# Patient Record
(demographics unavailable — no encounter records)

---

## 2017-06-18 NOTE — PDHPUP
History & Physical Update


H&P update statement: 


This history and physical update is based on an assessment of the patient which 

was completed after admission or registration (within 24 hours), but prior to 

the surgery/procedure.








1. Bilateral~Hip Dyplasia, Left side symptomatic with~resultant labral tear


2. Left~Femoroacetabular impingement (BARBARA) Cam type, 


3. Left excessive femoral ante-torsion (48 degrees)





HISTORY OF PRESENT ILLNESS:


Afshanis a 27 y.o.~~~active female~who I have had the pleasure to consult on 

today.~I have enjoyed meeting her. She~lives in [place], [place]. ~Afshanworks 

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 ~She~is ; she~has 3~children. ~Afshan

enjoys running, walking, hiking and working out but not active now.





Lauren's left~hip pain started in~June~2013, with no~recalled trauma or injury, 

and with no~previous complaints.~Afshandoes not have~a known history of hip 

dysplasia. 





Presentation today is of~anterior, groin left~hip pain. ~The hip does~wake her~

at night and does~click and catch on her. Sitting can be uncomfortable~for her. 

Afshandoes~report suffering from lower back pain episodes and she has 

scoliosis.





Afshanhas~participated in physical therapy for 1.5 years~and has not~tried 

other conservative measures including hip injections . She~has not~received 

sufficient symptomatic improvement.





Afshanhas~utilized medication for pain management, including NSAID and OTC 

acetaminophen. Afshanhas used medication for 3 years.





Afsahnhas occasional clicking on the right.





Afshanunderstands that she~has a hip and pelvis problem which should be 

researched and wishes to get a better understanding of her~hip status, followed 

by an establishment of a treatment strategy, hoping she~would be able to get 

back to her~well being active life.














History:








Past medical history: ~


None which is relevant 





Relevant familial history: None which is relevant 





Past surgical history: 


Bladder Surgery in 2000





Afshandenies problematic issues with general anesthesia in the past.





I have reviewed, verified and agree with the past medical, surgical, family and 

social history.





Current Medications:~has a current medication list which includes the following 

prescription(s): ibuprofen and norgestimate-ethinyl estradiol.





ALLERGIES:~has No Known Allergies.














Objective:








Physical Examination:


Afshanis 5~feet 6~inches tall and weighs~155~Lbs. Afshanis AAO x3; she~is well

-nourished, in NAD. Skin is warm and dry. ~Breathing is non-labored. ~CV with 

RRR by pulse. Abdomen is soft, NTND. 


Currently, she~walks with a normal~gait.


Trendelenburg sign is negative~and proprioception~is reduced, both~sides.


She~presents~with moderate~signs of joint laxity.~Beightons Score: 5


She~is fit looking. ~~


Lower spine examination is negative~for sciatic or femoral nerve irritation 

with negative~SLR &~femoral stretch tests. Range of motion of the spine is 

normal~for flexion, extension, and rotations, with no~associated pain. 


Strength, Sensation and pulses are normal - bilaterally


Ankles and knees exams are normal~and no~mal-alignment is evident. 


She~has~no leg length discrepancy.


Thigh circumference is symmetric~with no evidence for muscle atrophy~on both~

sides.





Hip ROM (degrees):











 FL ER


 At 90~hip FL IR


 At 90~hip FL AB AD EX IR


 Neutral hip ER


 Neutral hip


 


R 115 75 35 40 15 10 55 50


 


L 110P 45 50 45 5 10 70 30 (Pain inhibition)








Specific hip and pelvis tests:











 Quadrant CARLTON Roll Add. Longus


 


R + + Negative Negative


 


L +++ +++ Negative +++














 Glut. Med ITB Pos. Imp


 


R Negative


 5/5 strength Negative


 5/5 strength Negative


 


L Negative


 5/5 strength Negative


 5/5 strength Negative








Squeeze test measured normal


Bony Symphysis pubis is pain free~to touch while concentric activity of the 

rectus abdominis, does not~produce pain at its insertion.


Ilio Psos specific tests are negative for pain during cycling for both hips~and 

remarkable for no snap.


HF has good strength, non-painful both hips.


Anterior~capsule tenderness on the left.


Greater trochanteric burse is pain free~on both hips.


Piriformis tests: FAIR is negative, with no~local signs of neuritis related to 

sciatic nerve.


SIJs examination is produces pain on left side~with normal~CARLTON in relation 

and local tenderness.


Hamstrings tests are negative~functional contraction and positive~tendinopathy 

the left hip~at insertion.





On a daily basis, the following percentages reflect Gregs overall total pain:


Deep hip: 90%


Left SIJ: 10%





Imaging:


Radiology studies which I~have personally reviewed, analyzed and measured are 

below:





XR: 


AP of the hip and pelvis: 


Performed in a good~technique 


Coccyx to pubic symphysis distance 3.3~cm.


Upright. Zero degrees.





Shenton~Lines are interrupted on the left.~~~~~~~~~~~~~~~~~~~~~~~


No~Pathological signs are seen in the Symphysis Pubis. 


No~Pathological signs are seen at the Ischial~tuberosity. ~





Specific measurements show: 














 NSA~ LCE Sourcil~Angle  Sharp's angle Lat.


Cam Lat. Pincer C.Over~sign Head~Coverage % ATDmm


 


R 141 10 16 47 + - - 51 N


 


L 134 -8 27 56 + - - 46 N




















 Pos. wall sign ISS NAD ~~Dysplasia Comments


 


R Negative Negative 26.2~mm +++ 


 


L Negative Negative 25.7~mm +++ 


 


 Sclerosis Sup. Lat. OA Cysts Joint Space-WBZ Joint Space-Medial


 


R + Negative Negative 7.5~mm 5.4~mm


 


L + Negative Negative 7.5~mm 7.9~mm











X Table lateral: 


Anterior cam lesion is seen~on both hips.





Alpha Angle: ~


Right 71~degrees


Left 67~degrees





CT MEASUREMENTS:


Right hip:


Lateral center edge angle: 11 degrees


Equatorial acetabular version angle: 29 degrees anteverted.


Cranial acetabular version angle: 19 degrees anteverted.


Femoral neck shaft angle: 140 degrees.


Right femoral torsion measures 19 degrees.





Left hip:


Lateral center edge angle: 5 degrees


Equatorial acetabular version angle: 27 degrees anteverted.


Cranial acetabular version angle: 13 degrees anteverted.


Femoral neck shaft angle: 141 degrees


Left femoral torsion measures 48 degrees.








Impression and plan:Ivett Cavanaughis a 27 y.o.~active female~suffering from symptomatic left~hip pain due 

to Left Hip Dyplasia with symptomatic~labral tear and~Femoroacetabular 

impingement (BARBARA) Cam type,~causing significant disability to her~and altering~

her~sport and life activities.





She has similar radiographic features on the right side.





Physical examination, imaging, and her~story correspond with the diagnosis 

mentioned above.





I explained that hip dysplasia is a condition wherein the hip joint has 

excessive play~and instability due to a variety of factors, including the 

depth and adequacy of the socket, the orientation of the femur bone, and 

ligament laxity around the hip joint. Dysplasia ranges in severity from 

borderline to nolvia, with treatment options being specific to the specific 

nature of the problem. Left untreated, the instability in the hip joint can 

cause progressive tearing of the labrum and deterioration of the surface 

cartilage, ultimately resulting in progressive osteoarthritis of the hip. 





I explained that femoroacetabular impingement (BARBARA - Cam type) arises due to a 

bony or soft tissue conflict between the femur (ball) and acetabulum (socket) 

caused by an abnormality in the shape of the femoral head and neck. Over time, 

repetitive impingement can result in damage to the labrum and adjacent surface 

cartilage within the socket, ultimately giving rise to progressive 

osteoarthritis of the hip.





I explained that although a labral tear can be a source of pain, it is rarely 

the root of the problem and typically occurs secondary to an underlying 

abnormality in the shape and mechanics of the hip joint. 





I reviewed conservative treatment options for Dysplasia and BARBARA including 

activity modification to avoid positions of impingement or instability, 

physical therapy, non-steroidal anti-inflammatory medications, and various 

injections (corticosteroid and PRP) aimed at reducing inflammation in the hip 

joint or/and preventing dynamic instability and impingement. PRP injections may 

promote healing and reduce symptoms in certain cases but it will not repair 

chronically damaged tissue. Although these measures may help to buy time~and 

reduce current level of symptoms, they are not a definitive solution to the 

problem given the underlying abnormality in the shape of the hip joint.





Patients who have failed conservative management and continue to experience 

symptoms are candidates for definitive surgical treatment, which may consist of 

hip arthroscopy alone or in combination with more invasive bony realignment 

procedures of the hip socket and/or femur called periacetabular osteotomy (JANINE) 

or derotational femoral osteotomy (DFO). 





Hip arthroscopy typically includes treating the labrum with either repair or 

reconstruction of the torn labrum; as well as addressing the underlying 

abnormalities by restoring the normal shape to the hip joint. If the cartilage 

is damaged a Microfracture surgical procedure may also be necessary to help 

stimulate the growth of fibrocartilage. If a patient requires a labral 

reconstruction or a Microfracture, the initial rehabilitation from the surgery 

may take longer, but the long term results are typically favorable.





I reviewed the technical aspects of hip arthroscopy including risks, benefits, 

and expected course of recovery. Lauren~understands that hip arthroscopy is a 

minimally invasive outpatient procedure carried out through small incisions on 

the outer aspect of the hip joint. During surgery, the labral tear will be 

identified and either repaired or reconstructed~using bone anchors and suture 

material. Additionally, any excessive bone will be removed with a high-speed 

micky to reshape the hip joint and restore normal anatomy. Risks include 

infection, bleeding, injury to nearby nerves or vessels, stiffness, persistent 

pain, instability, venous thromboembolic disease, and traction related 

complications including temporary foot numbness. Rarely, revision surgery may 

be required to address these problems. Overall recovery takes approximately 4~~

8~months depending on the extent of damage and degree of repair.


In the event that the labral tissue quality is inadequate for successful repair 

and healing, Lauren~understands that a labral reconstruction will be performed. 

This procedure entails placing a cadaver tissue graft within the hip joint and 

stabilizing it with bone anchors to build a new labrum. The overall recovery 

time for labral reconstruction is similar to that of labral repair, although 

the surgical procedure takes longer to perform.





I reviewed the technical aspects of periacetabular osteotomy (JANINE) including 

risks, benefits, and expected course of recovery. Lauren~understands that JANINE 

is an inpatient procedure carried out through two medium sized incisions on the 

front and back of the hip joint. The hip socket is cut, realigned, and 

stabilized with 2 ~3 internal screws. Risks include infection, bleeding, 

injury to nearby nerves or vessels, stiffness, persistent pain, instability, 

failure of bony healing, implant related complications, and venous 

thromboembolic disease. Rarely, revision surgery may be required to address 

these problems. Risks, potential complications, side effects and recovery from 

surgical procedure were discussed in length. We explained how this surgery is 

an open procedure, and though patients tend to do well in the long-term, it 

involves significant pain in the first 2-4 weeks post-op and a rather lengthy 

rehab.~Overall recovery takes approximately 6 ~12~months depending on the 

extent of damage and degree of repair. Afshanunderstands that she~will undergo 

hip arthroscopy 1 week prior to the JANINE to address damage inside the hip joint.





Afshanunderstands that hip arthroscopy and JANINE are two separate procedures 

that are best performed one week apart, with the arthroscopy commencing first 

to "tighten up" any pathology evident in the hip joint (labral repair, etc.) 

and the JANINE open procedure occurring 7-10 days later to realign the acetabulum.





Afshanwill review the info presented.





In order to obtain more detailed information regarding the alignment, 

orientation, and shape of the bony hip and pelvis I will order a CT scan to be 

performed. The results of the CT scan, including femoral torsion and acetabular 

version measured values and 3D images, will aid me in deciding on the best 

treatment strategy and surgical pre-planning.





In order to evaluate the integrity of the surface cartilage within the hip joint

, I will order a delayed gadolinium enhanced MRI of cartilage (dGEMRIC). This 

study will determine whetherAdrianis a good candidate for hip preservation 

surgery.





Afshanwill talk with our surgical scheduler about possible surgery dates.





Afshanis happy with this plan.





I have also supplied her~with handouts, outlining the expected surgical 

treatment and rehab involved.





I wish~Afshanall the best,











~~


Linus Bradley MD

## 2017-06-19 NOTE — PDANEPAE
ANE History of Present Illness


Patient presents for surgery





ANE Past Medical History





- Cardiovascular History


Hx Hypertension: No


Hx Arrhythmias: No


Hx Chest Pain: No


Hx Coronary Artery / Peripheral Vascular Disease: No


Hx CHF / Valvular Disease: No


Hx Palpitations: No





- Pulmonary History


Hx COPD: No


Hx Asthma/Reactive Airway Disease: No


Hx Recent Upper Respiratory Infection: No


Hx Oxygen in Use at Home: No





- Neurologic History


Hx Cerebrovascular Accident: No


Hx Seizures: No


Hx Dementia: No





- Endocrine History


Hx Diabetes: No





- Renal History


Hx Renal Disorders: Yes





- Liver History


Hx Hepatic Disorders: No





- Neurological & Psychiatric Hx


Hx Neurological and Psychiatric Disorders: No





- Cancer History


Hx Cancer: No





- Congenital Disorder History


Hx Congenital Disorders: Yes





- GI History


Hx Gastrointestinal Disorders: No





- Chronic Pain History


Chronic Pain: Yes (bilateral hips)





ANE Review of Systems


Review of systems is: negative





- Exercise capacity


Exercise capacity: >=4 METS


METS (RN): 5 METS





ANE Patient History





- Allergies


Allergies/Adverse Reactions: 








No Known Allergies Allergy (Verified 05/11/17 11:09)


 








- Home Medications


Home Medications: 








Acetaminophen [Tylenol  mg (*)] 500 mg PO DAILY PRN 04/28/17 [Last Taken 

Unknown]


Ferrous Sulfate [Slow Fe 140 MG (*)] 140 mg PO DAILY 04/28/17 [Last Taken 

Unknown]


Ibuprofen [Motrin (*)] 400 mg PO DAILY PRN 04/28/17 [Last Taken Unknown]


Loratadine [Claritin 10 mg] 10 mg PO DAILY PRN 04/28/17 [Last Taken Unknown]


Multivitamins [Multivitamin (*)] 1 each PO DAILY 04/28/17 [Last Taken Unknown]


Naproxen Sodium [Aleve 220 MG (*)] 220 mg PO DAILY PRN 04/28/17 [Last Taken 

Unknown]


Norgestimate-Ethinyl Estradiol [Sprintec] 1 each PO DAILY 04/28/17 [Last Taken 

Unknown]








- NPO status


NPO Since - Liquids (Date): 06/18/17


NPO Since - Solids (Date): 06/18/17


NPO Since - Solids (Time): 20:00





- Anes Hx


Anes Hx: post operative nausea





- Smoking Hx


Smoking Status: Never smoked





- Family Anes Hx


Family Hx Anesthesia Complications: none





ANE Labs/Vital Signs





- Vital Signs


Blood Pressure: 125/88


Heart Rate: 111


Respiratory Rate: 16


O2 Sat (%): 99


Height: 167.64 cm


Weight: 68.039 kg





ANE Physical Exam





- Airway


Mallampati Score: Class 1





- Pulmonary


Pulmonary: no respiratory distress





- Cardiovascular


Cardiovascular: regular rate and rhythym





- ASA Status


ASA Status: II (Prominent upper incisors)





ANE Anesthesia Plan


Anesthesia Plan: general endotracheal anesthesia (RBA discussed, patient 

declines pre-op epidural but consents to PO epidural and or ss FNB in PACU if 

needed. )

## 2017-06-19 NOTE — POSTANESTH
Post Anesthetic Evaluation


Cardiovascular Status: Normal, Stable


Respiratory Status: Requires Airway Assist


Level of Consciousness/Mental Status: Moderately Sleepy


Pain Control: Adequate, Prn Tx Ordered


Nausea/Vomiting Control: Adequate, Prn Tx Ordered


Complications Possibly Related to Anesthesia: None Noted

## 2017-06-19 NOTE — SUROPNOTE
[facility] Operative Report





- Surgery





Surgery was performed at [facility] on 6/19/17 ~~~~~~





~~


OPERATION NOTE~on Lauren Leonardo





Diagnosis: 


1.   Left~Femoroacetabular impingement (BARBARA) Cam type, Mixed type, Cartilage 

damage


2.   Left~Hip Dyplasia





Indication: Failure to obtain satisfactory results with long standing 

conservative measures.





Operation~1:~Left~Arthroscopic Labral repair, Debridement of loose cartilage 

flap + micro fracture of acetabulum, CAM resection, Synovectomy, Capsular repair





Surgeon: ~Milind Coates MD





First Assistant:~Lobo MERA





Anaesthetic:~General





Findings~~Left~Hip:


Labrum: Torn and Frayed around 12-4~O'clock.~attached to inside-out flap, very 

hypertrophic


Acetabulum: Cartilage damage grade 4-dysplasia flap,~extending around 

chondrolabral junction, circumferentially, from 12 (bubble)~to 4, 20-50% rim to 

acatabular fossa


Fovea: Partial tear of LT


Femoral Head: Normal cartilage


Synovium: severe~synovitis


Peripheral Compartment: Anterolateral CAM between 12~Oclock superiorly and 5~O

clock anteriorly - minimal





Procedure: Supine on operating table. General anaesthetic. Antibiotics given.


Standard traction set up, without perineal post.





A spinal needle was guided to the femoral head neck junction and traction 

gradually applied with the joint vented. Local anesthetic infiltrated into the 

skin around the portals. Once 15mm of distraction was achieved the hip needle 

was then passed into the joint staying as close to the femoral head as possible.





A Nytenol wire was passed through the hip needle ensuring that it passed all 

the way to the fovea to


confirm central placement of the needle. Skin was incised and then the portals 

sequentially dilated to 7 mm. Switching stick inserted and 30 scope passed 

over the top. Under dry scope conditions the anterior portal was created by 

passing the hip needle into the joint under direct vision. Again this was 

dilated up to 7 mm and the slotted canule was inserted. The saline was then 

turned on and the joint irrigated. The joint was carefully inspected and 

photographed with findings as above. The arthroscope was switched to the 70 

scope to complete the inspection.





A longitudinal intra portal capsulotomy was then performed using Galena blade 

and the 50 Arthrocare wand to connect the two portals.





Central Compartment Intervention:


Synovectomy was performed. Large IO flap was debrided. Bone was rasped.


The labrum was then repaired~with 3~peek anchors IO, achieving good anatomical 

rim fixation.


Microfractures were applied to the area where the bone was exposed from missing 

cartilage. ~


LT was treated with RF wand to shrink and stabilize reactive tissue.~





Peripheral Compartment Intervention: 


A box-shaped capsulotomy was made with the assistance of SpeedStitch traction 

suture. This allowed traction on the capsule and a good view of the femoral 

neck with smaller capsulotomy.


The articular margin where sphericity was lost was marked with the Arthrocare 

wand under X-ray control. Bone lateral to this was removed with the the micky. 

Portals were switched to deal with the superior headneck junction. Care was 

taken not to stray posterior and laterally in view of the location of the 

retinacular vessels. The cam lesion was addressed from 12~to 5~Oclock.





A dynamic impingement test was undertaken and vision with 90 of flexion and 15

 of internal rotation to confirm that there was no bony or soft tissue 

impingement or deformation of the labrum. ~Good clearance was obtained with 

good labral seal.





The joint was thoroughly irrigated of any loose debris and the anterior capsule 

was repaired with 3~No.1 vicryl stitches, closing 80% of the capsulotomy. The 

skin was then closed with Nylon. Padded dressing was applied.





~~


OPERATION NOTE~on Lauren Leonardo





Diagnosis:Left~Hip Dyplasia~~- Anteverted femoral neck / antetorsion of femur





Indication:~Failure to obtain satisfactory results with long standing 

conservative measures. 





Surgeon: Milind Coates MD





First Assistant: Donnie Martinez MD





Anaesthetic: General





Operation~2: Open left~derotational femoral osteotomy





Procedure: Lauren~was positioned supine on a distraction table, with the legs 

in a scissors position (operated leg leveled, contra-lateral leg hyperextended) 

so fluoroscopy could obtain both AP and lateral views. A 4~cm incision was made 

proximal to the greater trochanter (GT). Using a drill guide and a drill, an 

entry point was established at the tip of the GT followed by introduction of 

ball-tip guide wire~introduced through the femoral canal. ~~After measurements 

of required nail length and width, rimming was performed to 10~mm (in 0.5 mm 

increments) distal to the femoral isthmus. Rimming to~14~mm was performed~to 

the level of the area of the planned osteotomy. An intramedullary saw (12~mm) 

was introduced into the femoral canal, 6~cm below the lesser trochanter. In 

small increments, the medullary saw performed a circumferential inside out cut 

through the femur. This cut was completed in an outside-in manner using a 4~mm 

osteotome. Just before the osteotomy was complete and displacement was 

confirmed with fluoroscopy, 2 Albert pins were drilled~into the femur bone (

one to the lateral GT, one to the supra-condylar region of the distal femur). 

Using fluoroscopic guidance the angle between the Albert pins~as measured 

with a goniometer~and boot scale, the varus and derotational osteotomy was 

performed by rotating the foot outward. The correction planned and obtained was 

approximately 30~degrees external rotation (internal rotation of the femur to a 

relative retroverted torsion). A 9~mm~diameter/ 360~mm length nail was 

introduced into the femoral canal and was~locked proximally and distally with 

screws. Post-operative X-rays were obtained to confirm position and location of 

nail. Albert pins were removed,~ITB was approximated, and sub-cutis and skin 

layers were closed with absorbable suture/nylon/stables.





After surgery,~Lauren~moved both lower limbs and had no NV compromise. ROM in 

neutral hip corresponded well with the torsion change.





Evaluation under Anesthesia:





Pre:











 IR 90 ER 90 ABD Flexion


 


Right 30 70 45 110


 


Left 50 50 45 110











Post op instructions:


1. Non~weight bearing crutches for 6~weeks


2. Pain killers as prescribed


3. Follow up visit with me, as scheduled, where a rehab protocol would be 

discussed


4. Avoid hip external rotation for 4 weeks


5. ~~25 days of NSAIDS need to be taken in order to prevent the possible 

formation of HO - stop 2 days before JANINE


6. Continuous SCDs~


7.~CPM 50~degrees until JANINE








Kind regards, 


~~


Dr. Milind Coates

## 2017-06-21 NOTE — SOAPPROG
SOAP Progress Note


Assessment/Plan: 


Assessment:





2 days post op


Left hip arthroscopy


Left Derotational Osteotomy




















Plan:





DC home


LEFT JANINE on 6/26/17 06/21/17 10:10





Subjective: 





Lauren is doing quite well this morning.  Even without an epidural, her pain is 

well managed with oral analgesics.  She denies any cp, no sob, or nausea.  She 

is ready to go home.


Objective: 





 Vital Signs











Temp Pulse Resp BP Pulse Ox


 


 36.9 C   95   14   97/60 L  94 


 


 06/21/17 07:32  06/21/17 07:32  06/21/17 07:32  06/21/17 04:00  06/21/17 07:32








 Laboratory Results





 06/20/17 04:28 





 06/20/17 04:28 





 











 06/20/17 06/21/17 06/22/17





 05:59 05:59 05:59


 


Intake Total 4700 1000 


 


Output Total 3200 1850 


 


Balance 1500 -850 








Well appearing in NAD





Left hip:





dressings clean, dry, intact


some scattered ecchymosis and edema


NVI distally


edema and ecchymosis down to knee


full ROM of foot and ankle








- Pending Discharge


Pending Discharge Within 24 Hours: Yes


Pending Discharge Date: 06/22/17


Pending Discharge Time: 11:00





ICD10 Worksheet


Patient Problems: 


 Problems











Problem Status Onset


 


Post-op pain Acute  














- ICD10 Problem Qualifiers


(1) Post-op pain

## 2017-06-22 NOTE — PDGENHP
History and Physical





- Chief Complaint


Left Hip Pain





- History of Present Illness


1. Bilateral~Hip Dyplasia, Left side symptomatic with~resultant labral tear


2. Left~Femoroacetabular impingement (BARBARA) Cam type, 


3. Left excessive femoral ante-torsion (48 degrees)





HISTORY OF PRESENT ILLNESS:


Afshanis a 27 y.o.~~~active female~who I have had the pleasure to consult on 

today.~I have enjoyed meeting her. She~lives in [place], [place]. ~Afshanworks 

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 ~She~is ; she~has 3~children. ~Afshan

enjoys running, walking, hiking and working out but not active now.





Lauren's left~hip pain started in~June~2013, with no~recalled trauma or injury, 

and with no~previous complaints.~Afshandoes not have~a known history of hip 

dysplasia. 





Presentation today is of~anterior, groin left~hip pain. ~The hip does~wake her~

at night and does~click and catch on her. Sitting can be uncomfortable~for her. 

Afshandoes~report suffering from lower back pain episodes and she has 

scoliosis.





Afshanhas~participated in physical therapy for 1.5 years~and has not~tried 

other conservative measures including hip injections . She~has not~received 

sufficient symptomatic improvement.





Afshanhas~utilized medication for pain management, including NSAID and OTC 

acetaminophen. Afshanhas used medication for 3 years.





Afshanhas occasional clicking on the right.





Afshanunderstands that she~has a hip and pelvis problem which should be 

researched and wishes to get a better understanding of her~hip status, followed 

by an establishment of a treatment strategy, hoping sheIvettwould be able to get 

back to her~well being active life.














History:








Past medical history: ~


None which is relevant 





Relevant familial history: None which is relevant 





Past surgical history: 


Bladder Surgery in 2000





Afshandenies problematic issues with general anesthesia in the past.





I have reviewed, verified and agree with the past medical, surgical, family and 

social history.





Current Medications:~has a current medication list which includes the following 

prescription(s): ibuprofen and norgestimate-ethinyl estradiol.





ALLERGIES:~has No Known Allergies.














Objective:








Physical Examination:


Afshanis 5~feet 6~inches tall and weighs~155~Lbs. Afshanis AAO x3; she~is well

-nourished, in NAD. Skin is warm and dry. ~Breathing is non-labored. ~CV with 

RRR by pulse. Abdomen is soft, NTND. 


Currently, she~walks with a normal~gait.


Trendelenburg sign is negative~and proprioception~is reduced, both~sides.


She~presents~with moderate~signs of joint laxity.~Beightons Score: 5


She~is fit looking. ~~


Lower spine examination is negative~for sciatic or femoral nerve irritation 

with negative~SLR &~femoral stretch tests. Range of motion of the spine is 

normal~for flexion, extension, and rotations, with no~associated pain. 


Strength, Sensation and pulses are normal - bilaterally


Ankles and knees exams are normal~and no~mal-alignment is evident. 


She~has~no leg length discrepancy.


Thigh circumference is symmetric~with no evidence for muscle atrophy~on both~

sides.





Hip ROM (degrees):











 FL ER


 At 90~hip FL IR


 At 90~hip FL AB AD EX IR


 Neutral hip ER


 Neutral hip


 


R 115 75 35 40 15 10 55 50


 


L 110P 45 50 45 5 10 70 30 (Pain inhibition)








Specific hip and pelvis tests:











 Quadrant CARLTON Roll Add. Longus


 


R + + Negative Negative


 


L +++ +++ Negative +++














 Glut. Med ITB Pos. Imp


 


R Negative


 5/5 strength Negative


 5/5 strength Negative


 


L Negative


 5/5 strength Negative


 5/5 strength Negative








Squeeze test measured normal


Bony Symphysis pubis is pain free~to touch while concentric activity of the 

rectus abdominis, does not~produce pain at its insertion.


Ilio Psos specific tests are negative for pain during cycling for both hips~and 

remarkable for no snap.


HF has good strength, non-painful both hips.


Anterior~capsule tenderness on the left.


Greater trochanteric burse is pain free~on both hips.


Piriformis tests: FAIR is negative, with no~local signs of neuritis related to 

sciatic nerve.


SIJs examination is produces pain on left side~with normal~CARLTON in relation 

and local tenderness.


Hamstrings tests are negative~functional contraction and positive~tendinopathy 

the left hip~at insertion.





On a daily basis, the following percentages reflect Lauren's overall total pain:


Deep hip: 90%


Left SIJ: 10%





Imaging:


Radiology studies which I~have personally reviewed, analyzed and measured are 

below:





XR: 


AP of the hip and pelvis: 


Performed in a good~technique 


Coccyx to pubic symphysis distance 3.3~cm.


Upright. Zero degrees.





Shenton~Lines are interrupted on the left.~~~~~~~~~~~~~~~~~~~~~~~


No~Pathological signs are seen in the Symphysis Pubis. 


No~Pathological signs are seen at the Ischial~tuberosity. ~





Specific measurements show: 














 NSA~ LCE Sourcil~Angle  Sharp's angle Lat.


Cam Lat. Pincer C.Over~sign Head~Coverage % ATDmm


 


R 141 10 16 47 + - - 51 N


 


L 134 -8 27 56 + - - 46 N




















 Pos. wall sign ISS NAD ~~Dysplasia Comments


 


R Negative Negative 26.2~mm +++ 


 


L Negative Negative 25.7~mm +++ 


 


 Sclerosis Sup. Lat. OA Cysts Joint Space-WBZ Joint Space-Medial


 


R + Negative Negative 7.5~mm 5.4~mm


 


L + Negative Negative 7.5~mm 7.9~mm











X Table lateral: 


Anterior cam lesion is seen~on both hips.





Alpha Angle: ~


Right 71~degrees


Left 67~degrees





CT MEASUREMENTS:


Right hip:


Lateral center edge angle: 11 degrees


Equatorial acetabular version angle: 29 degrees anteverted.


Cranial acetabular version angle: 19 degrees anteverted.


Femoral neck shaft angle: 140 degrees.


Right femoral torsion measures 19 degrees.





Left hip:


Lateral center edge angle: 5 degrees


Equatorial acetabular version angle: 27 degrees anteverted.


Cranial acetabular version angle: 13 degrees anteverted.


Femoral neck shaft angle: 141 degrees


Left femoral torsion measures 48 degrees.








Impression and plan:Ivett Cavanaughis a 27 y.o.~active female~suffering from symptomatic left~hip pain due 

to Left Hip Dyplasia with symptomatic~labral tear and~Femoroacetabular 

impingement (BARBARA) Cam type,~causing significant disability to her~and altering~

her~sport and life activities.





She has similar radiographic features on the right side.





Physical examination, imaging, and her~story correspond with the diagnosis 

mentioned above.





I explained that hip dysplasia is a condition wherein the hip joint has 

excessive play~and instability due to a variety of factors, including the 

depth and adequacy of the socket, the orientation of the femur bone, and 

ligament laxity around the hip joint. Dysplasia ranges in severity from 

borderline to nolvia, with treatment options being specific to the specific 

nature of the problem. Left untreated, the instability in the hip joint can 

cause progressive tearing of the labrum and deterioration of the surface 

cartilage, ultimately resulting in progressive osteoarthritis of the hip. 





I explained that femoroacetabular impingement (BARBARA - Cam type) arises due to a 

bony or soft tissue conflict between the femur (ball) and acetabulum (socket) 

caused by an abnormality in the shape of the femoral head and neck. Over time, 

repetitive impingement can result in damage to the labrum and adjacent surface 

cartilage within the socket, ultimately giving rise to progressive 

osteoarthritis of the hip.





I explained that although a labral tear can be a source of pain, it is rarely 

the root of the problem and typically occurs secondary to an underlying 

abnormality in the shape and mechanics of the hip joint. 





I reviewed conservative treatment options for Dysplasia and BARBARA including 

activity modification to avoid positions of impingement or instability, 

physical therapy, non-steroidal anti-inflammatory medications, and various 

injections (corticosteroid and PRP) aimed at reducing inflammation in the hip 

joint or/and preventing dynamic instability and impingement. PRP injections may 

promote healing and reduce symptoms in certain cases but it will not repair 

chronically damaged tissue. Although these measures may help to buy time~and 

reduce current level of symptoms, they are not a definitive solution to the 

problem given the underlying abnormality in the shape of the hip joint.





Patients who have failed conservative management and continue to experience 

symptoms are candidates for definitive surgical treatment, which may consist of 

hip arthroscopy alone or in combination with more invasive bony realignment 

procedures of the hip socket and/or femur called periacetabular osteotomy (JANINE) 

or derotational femoral osteotomy (DFO). 





Hip arthroscopy typically includes treating the labrum with either repair or 

reconstruction of the torn labrum; as well as addressing the underlying 

abnormalities by restoring the normal shape to the hip joint. If the cartilage 

is damaged a Microfracture surgical procedure may also be necessary to help 

stimulate the growth of fibrocartilage. If a patient requires a labral 

reconstruction or a Microfracture, the initial rehabilitation from the surgery 

may take longer, but the long term results are typically favorable.





I reviewed the technical aspects of hip arthroscopy including risks, benefits, 

and expected course of recovery. Lauren~understands that hip arthroscopy is a 

minimally invasive outpatient procedure carried out through small incisions on 

the outer aspect of the hip joint. During surgery, the labral tear will be 

identified and either repaired or reconstructed~using bone anchors and suture 

material. Additionally, any excessive bone will be removed with a high-speed 

micky to reshape the hip joint and restore normal anatomy. Risks include 

infection, bleeding, injury to nearby nerves or vessels, stiffness, persistent 

pain, instability, venous thromboembolic disease, and traction related 

complications including temporary foot numbness. Rarely, revision surgery may 

be required to address these problems. Overall recovery takes approximately 4~~

8~months depending on the extent of damage and degree of repair.


In the event that the labral tissue quality is inadequate for successful repair 

and healing, Lauren~understands that a labral reconstruction will be performed. 

This procedure entails placing a cadaver tissue graft within the hip joint and 

stabilizing it with bone anchors to build a new labrum. The overall recovery 

time for labral reconstruction is similar to that of labral repair, although 

the surgical procedure takes longer to perform.





I reviewed the technical aspects of periacetabular osteotomy (JANINE) including 

risks, benefits, and expected course of recovery. Lauren~understands that JANINE 

is an inpatient procedure carried out through two medium sized incisions on the 

front and back of the hip joint. The hip socket is cut, realigned, and 

stabilized with 2 ~3 internal screws. Risks include infection, bleeding, 

injury to nearby nerves or vessels, stiffness, persistent pain, instability, 

failure of bony healing, implant related complications, and venous 

thromboembolic disease. Rarely, revision surgery may be required to address 

these problems. Risks, potential complications, side effects and recovery from 

surgical procedure were discussed in length. We explained how this surgery is 

an open procedure, and though patients tend to do well in the long-term, it 

involves significant pain in the first 2-4 weeks post-op and a rather lengthy 

rehab.~Overall recovery takes approximately 6 ~12~months depending on the 

extent of damage and degree of repair. Lauren~understands that she~will undergo 

hip arthroscopy 1 week prior to the JANINE to address damage inside the hip joint.





Afshanunderstands that hip arthroscopy and JANINE are two separate procedures 

that are best performed one week apart, with the arthroscopy commencing first 

to "tighten up" any pathology evident in the hip joint (labral repair, etc.) 

and the JANINE open procedure occurring 7-10 days later to realign the acetabulum.





Afshanwill review the info presented.





In order to obtain more detailed information regarding the alignment, 

orientation, and shape of the bony hip and pelvis I will order a CT scan to be 

performed. The results of the CT scan, including femoral torsion and acetabular 

version measured values and 3D images, will aid me in deciding on the best 

treatment strategy and surgical pre-planning.





In order to evaluate the integrity of the surface cartilage within the hip joint

, I will order a delayed gadolinium enhanced MRI of cartilage (dGEMRIC). This 

study will determine whetherAdrianis a good candidate for hip preservation 

surgery.





Asfhanwill talk with our surgical scheduler about possible surgery dates.





Afshanis happy with this plan.





I have also supplied her~with handouts, outlining the expected surgical 

treatment and rehab involved.





I wishAdrianall the best,











~~


Linus Bradley MD





History Information





- Allergies/Home Medication List


Allergies/Adverse Reactions: 








No Known Allergies Allergy (Verified 05/11/17 11:09)


 





Home Medications: 








Ferrous Sulfate [Slow Fe 140 MG (*)] 140 mg PO DAILY 04/28/17 [Last Taken 06/18/ 17]


Loratadine [Claritin 10 mg] 10 mg PO DAILY PRN 04/28/17 [Last Taken 04/19/17]


Multivitamins [Multivitamin (*)] 1 each PO DAILY 04/28/17 [Last Taken Unknown]


Norgestimate-Ethinyl Estradiol [Sprintec 28 Day Tablet] 1 each PO DAILY 04/28/ 17 [Last Taken Unknown]





I have personally reviewed and updated: medical history





- Social History


Smoking Status: Never smoked

## 2017-06-26 NOTE — SUROPNOTE
[facility] Operative Report





- Surgery





Surgery was performed in [facility]





Diagnosis: Left


1.   Hip Acetabular Dysplasia





Operation:


Left~Latasha Acetabular Osteotomy (JANINE)





Surgeon: Milind Coates MD


Assistant:~~Lobo MERA


Anesthetic: General





Procedure:


General anesthetic. Antibiotics given. Cell saver in use. Fluoroscopy.





Phase 1:


Position lateral, diagonal skin incision between ischial tuberosity and greater 

trochanter as for posterior hip approach. Blunt split of glut max fibers. 

Identification of fat pad overlying sciatic nerve. Exposure of sciatic nerve 

under fat pad, gently retracting it away-medially to ischial tuberosity. 

Exposure of subcotoloid fossa proximal to short rotators. Using osteotomes and 

under fluoroscopy, osteotomy of subcotoloid fossa to sciatic notch proximal to 

ischial spine. Closure of lateral cut. Patient is turned supine.





Phase 2:


Skin incision just distal to ASIS. Using diathermy the iliac spine was exposed 

and inguinal ligament + Sartorious were retracted medially, taking the LFCN 

with them, protecting it. Inner ilium was dissected from iliacus muscle bluntly

, with a cob and swab. Dissection continued towards lateral superior ramus 

pubis. Using fluoroscopy an osteotomy of lateral superior ramus, just medial to 

tear drop, was performed with curved fish mouth osteotome.





Phase 3:


Osteotomy lines of the ilium were marked with diathermy as pre planned 

according to XR/CT and expected correction of acatabulum. 2 Shanz screws were 

drilled into central acetabular fragment, corresponding with planned correction 

angles, in order to mobilize central acetabular fragment after osteotomy is 

complete. ~Iliac osteotomy was performed with reciprocating saw and the main 

acetabular fragment was moved to realign weight bearing position. After 

confirmation of correction using fluoroscopy in AP and false profile planes, 

the fragment was fixed with 2 - 5.5mm ~full threaded~screws~and 2 - 4mm~~full 

threaded~screws.





Inguinal ligament and Sartorious were attached back to ASIS through drill holes.


Incision was closed according to soft tissue layers.


Skin was closed with subdermal Monocryl.


Final fluoro shots were obtained to confirm position/correction.


After surgery Lauren~moved both lower limbs and had no NV motor compromise.





Evaluation under anesthesia:


IR at 90 degrees hip flexion prior to JANINE was 50 degrees before DFO, 25 today,~

and after JANINE was 20~degrees.





Bleedin~cc into cell-saver, 135~of blood products were returned to 

patient.





Post op instructions:


1. Non~weight bearing crutches for 8~weeks


2. Epidural analgesia for 24-48 hours


3. Continuous SCD


4. Aspirin 81 mg X1 day once Epidural is discontinued


5. Avoid hip flexion past 90 and hip External rotation. 


6. PT according to my recommendations at follow up visit





Kind regards,








Dr. Milind Coates


.

## 2017-06-26 NOTE — PDANEPAE
ANE History of Present Illness





presents for L JANINE





ANE Past Medical History





- Cardiovascular History


Hx Hypertension: No


Hx Arrhythmias: No


Hx Chest Pain: No


Hx Coronary Artery / Peripheral Vascular Disease: No


Hx CHF / Valvular Disease: No


Hx Palpitations: No





- Pulmonary History


Hx COPD: No


Hx Asthma/Reactive Airway Disease: No


Hx Recent Upper Respiratory Infection: No


Hx Oxygen in Use at Home: No





- Neurologic History


Hx Cerebrovascular Accident: No


Hx Seizures: No


Hx Dementia: No





- Endocrine History


Hx Diabetes: No





- Renal History


Hx Renal Disorders: Yes





- Liver History


Hx Hepatic Disorders: No





- Neurological & Psychiatric Hx


Hx Neurological and Psychiatric Disorders: No





- Cancer History


Hx Cancer: No





- Congenital Disorder History


Hx Congenital Disorders: Yes





- GI History


Hx Gastrointestinal Disorders: No





- Chronic Pain History


Chronic Pain: Yes (bilateral hips)





ANE Review of Systems





- Exercise capacity


Exercise capacity: >=4 METS





ANE Patient History





- Allergies


Allergies/Adverse Reactions: 








No Known Allergies Allergy (Verified 05/11/17 11:09)


 








- Home Medications


Home Medications: 








Ferrous Sulfate [Slow Fe 140 MG (*)] 140 mg PO DAILY 04/28/17 [Last Taken 1 

Week Ago]


Loratadine [Claritin 10 mg] 10 mg PO DAILY PRN 04/28/17 [Last Taken 04/19/17]


Multivitamins [Multivitamin (*)] 1 each PO DAILY 04/28/17 [Last Taken 05/29/17]


Norgestimate-Ethinyl Estradiol [Sprintec 28 Day Tablet] 1 each PO DAILY 04/28/ 17 [Last Taken 06/25/17 23:50]








- NPO status


NPO Since - Liquids (Date): 06/26/17


NPO Since - Liquids (Time): 06:00


NPO Since - Solids (Date): 06/25/17


NPO Since - Solids (Time): 23:45





- Smoking Hx


Smoking Status: Never smoked





- Family Anes Hx


Family Hx Anesthesia Complications: none





ANE Labs/Vital Signs





- Vital Signs


Blood Pressure: 124/79


Heart Rate: 102


Respiratory Rate: 12


O2 Sat (%): 98


Height: 167.64 cm


Weight: 68.039 kg





ANE Physical Exam





- Airway


Neck exam: FROM


Mallampati Score: Class 1


Mouth exam: normal dental/mouth exam





- Pulmonary


Pulmonary: no respiratory distress





- Cardiovascular


Cardiovascular: regular rate and rhythym





- ASA Status


ASA Status: I





ANE Anesthesia Plan


Anesthesia Plan: general endotracheal anesthesia

## 2017-06-27 NOTE — SOAPPROG
SOAP Progress Note


Assessment/Plan: 


Assessment:





1 day post op





Left Periacetabular Osteotomy




















Plan:





Wean down and off PCA


Change oral analgesic from Oxycodone to Dilaudid


Pelvis Xray in 2 days





06/27/17 20:59





Subjective: 





Lauren is doing quite well today.  She has been ambulating with crutches (non-

weight bearing) in the hallways and getting up to the bathroom.  Her pain has 

been managed with the PCA and Oxycodone but says the former is not lasting 4hrs 

for pain relief.  She denies any cp, sob or nausea.


Objective: 





 Vital Signs











Temp Pulse Resp BP Pulse Ox


 


 37.1 C   132 H  16   115/75   97 


 


 06/27/17 20:00  06/27/17 20:00  06/27/17 20:00  06/27/17 20:00  06/27/17 20:00








 Laboratory Results





 06/27/17 04:15 





 06/27/17 04:15 





 











 06/26/17 06/27/17 06/28/17





 05:59 05:59 05:59


 


Intake Total  4265 


 


Output Total  4700 2350


 


Balance  -435 -2350








Well appearing in NAD





Left hip:





wide spread ecchymosis (mostly from last weeks DFO)


edema


dressings clean dry intact





NVI distally


Full ROM of foot and ankle





- Pending Discharge


Pending Discharge Within 48 Hours: Yes


Pending Discharge Date: 06/29/17


Pending Discharge Time: 11:00





ICD10 Worksheet


Patient Problems: 


 Problems











Problem Status Onset


 


Post-op pain Acute

## 2017-06-28 NOTE — SOAPPROG
SOAP Progress Note


Assessment/Plan: 


Assessment:


























Plan:





06/28/17 21:24


POD 2, doing very well w/o epidural.


NV intact although some upper toes tingling, minimal.


Passed gas and stool but has a bit of abdominal distention, none painful.


Will do XR tomorrow and if good on track to be discharged.


Dr Coates





Objective: 





 Vital Signs











Temp Pulse Resp BP Pulse Ox


 


 36.6 C   128 H  16   118/79   94 


 


 06/28/17 15:36  06/28/17 15:36  06/28/17 15:36  06/28/17 15:36  06/28/17 15:36








 Laboratory Results





 06/27/17 04:15 





 06/27/17 04:15 





 











 06/27/17 06/28/17 06/29/17





 05:59 05:59 05:59


 


Intake Total 4265 1200 2500


 


Output Total 3320 0248 Mqdthwb -078 -2008 2500














ICD10 Worksheet


Patient Problems: 


 Problems











Problem Status Onset


 


Post-op pain Acute

## 2017-06-29 NOTE — SOAPPROG
SOAP Progress Note


Assessment/Plan: 


Assessment:





3rd day post op





Left Periacetabular Osteotomy


Tachycardia; low but expected H/H

















Plan:





monitor bp; hold off transfusion unless symptomatic


oral dilaudid


DC home likely tomorrow





06/27/17 20:59





06/29/17 21:56





06/29/17 22:00





Subjective: 





Lauren continues to do well.  She has been tachyacardic today and yesterday 

into the 120s.  H/H drawn this am, coming up slowly but still 7-8/22-23.  She 

denies any symptoms, not sob, no syncopal or dizziness either in bed or up 

ambulating.  She denies any cp or nausea.  Dilaudid is working better for 

analgesia than the Oxycodone.


Objective: 





 Vital Signs











Temp Pulse Resp BP Pulse Ox


 


 36.7 C   119 H  16   108/78   99 


 


 06/29/17 19:27  06/29/17 19:27  06/29/17 19:27  06/29/17 19:27  06/29/17 19:27








 Laboratory Results





 06/29/17 09:23 





 06/27/17 04:15 





 











 06/28/17 06/29/17 06/30/17





 05:59 05:59 05:59


 


Intake Total 1200 3850 3140


 


Output Total 3950 1400 


 


Balance -2750 2450 3140








Well appearing in NAD





Left Hip:





ecchymosis is diminishing 


edema


dressings clean dry intact


NVI distally





Full ROM of foot and ankle








- Pending Discharge


Pending Discharge Within 24 Hours: Yes


Pending Discharge Date: 06/30/17


Pending Discharge Time: 11:00





ICD10 Worksheet


Patient Problems: 


 Problems











Problem Status Onset


 


Post-op pain Acute